# Patient Record
Sex: FEMALE | Race: WHITE | NOT HISPANIC OR LATINO | Employment: OTHER | URBAN - METROPOLITAN AREA
[De-identification: names, ages, dates, MRNs, and addresses within clinical notes are randomized per-mention and may not be internally consistent; named-entity substitution may affect disease eponyms.]

---

## 2017-01-15 ENCOUNTER — HOSPITAL ENCOUNTER (OUTPATIENT)
Dept: RADIOLOGY | Facility: MEDICAL CENTER | Age: 62
End: 2017-01-15
Attending: FAMILY MEDICINE
Payer: COMMERCIAL

## 2017-01-15 ENCOUNTER — OFFICE VISIT (OUTPATIENT)
Dept: URGENT CARE | Facility: PHYSICIAN GROUP | Age: 62
End: 2017-01-15
Payer: COMMERCIAL

## 2017-01-15 VITALS
WEIGHT: 135.8 LBS | HEART RATE: 65 BPM | OXYGEN SATURATION: 96 % | TEMPERATURE: 99.1 F | BODY MASS INDEX: 22.6 KG/M2 | DIASTOLIC BLOOD PRESSURE: 76 MMHG | SYSTOLIC BLOOD PRESSURE: 120 MMHG

## 2017-01-15 DIAGNOSIS — R30.0 DYSURIA: ICD-10-CM

## 2017-01-15 DIAGNOSIS — R10.32 LEFT LOWER QUADRANT PAIN: ICD-10-CM

## 2017-01-15 LAB
APPEARANCE UR: CLEAR
BILIRUB UR STRIP-MCNC: NORMAL MG/DL
COLOR UR AUTO: NORMAL
GLUCOSE UR STRIP.AUTO-MCNC: NORMAL MG/DL
KETONES UR STRIP.AUTO-MCNC: NORMAL MG/DL
LEUKOCYTE ESTERASE UR QL STRIP.AUTO: NORMAL
NITRITE UR QL STRIP.AUTO: NORMAL
PH UR STRIP.AUTO: 7 [PH] (ref 5–8)
PROT UR QL STRIP: NORMAL MG/DL
RBC UR QL AUTO: NORMAL
SP GR UR STRIP.AUTO: 1
UROBILINOGEN UR STRIP-MCNC: NORMAL MG/DL

## 2017-01-15 PROCEDURE — 99214 OFFICE O/P EST MOD 30 MIN: CPT | Performed by: FAMILY MEDICINE

## 2017-01-15 PROCEDURE — 81002 URINALYSIS NONAUTO W/O SCOPE: CPT | Performed by: FAMILY MEDICINE

## 2017-01-15 PROCEDURE — 74176 CT ABD & PELVIS W/O CONTRAST: CPT

## 2017-01-15 RX ORDER — METRONIDAZOLE 500 MG/1
500 TABLET ORAL EVERY 12 HOURS
Qty: 14 TAB | Refills: 0 | Status: SHIPPED | OUTPATIENT
Start: 2017-01-15 | End: 2017-01-22

## 2017-01-15 RX ORDER — SULFAMETHOXAZOLE AND TRIMETHOPRIM 800; 160 MG/1; MG/1
1 TABLET ORAL EVERY 12 HOURS
Qty: 14 TAB | Refills: 0 | Status: SHIPPED | OUTPATIENT
Start: 2017-01-15 | End: 2017-01-22

## 2017-01-15 ASSESSMENT — ENCOUNTER SYMPTOMS
FEVER: 0
CHILLS: 0
ABDOMINAL PAIN: 1
FOCAL WEAKNESS: 0
BACK PAIN: 1
EMPTYING BLADDER: 1
DIZZINESS: 0

## 2017-01-15 NOTE — MR AVS SNAPSHOT
Leidy Mahoney   1/15/2017 1:45 PM   Office Visit   MRN: [id number]    Department:  [facility]   Dept Phone:  [phone]    Description:  Female : 1955   Provider:  Bernard Mays M.D.           Reason for Visit     Cystitis pain when urinating, cramping, lower back pain, off and on x 4-5 days      Allergies as of 1/15/2017     Allergen Noted Reactions    Codeine 2009   Vomiting    Pcn [Penicillins] 2009   Vomiting    Levofloxacin 01/15/2017   Nausea      You were diagnosed with     Dysuria   [788.1.ICD-9-CM]       Left lower quadrant pain   [907210]         Vital Signs     Blood Pressure Pulse Temperature Weight Oxygen Saturation       120/76 mmHg 65 37.3 °C (99.1 °F) 61.598 kg (135 lb 12.8 oz) 96%       Basic Information     Date Of Birth Sex Race Ethnicity Preferred Language    1955 Female White Non- English      Health Maintenance        Date Due Completion Dates    IMM DTaP/Tdap/Td Vaccine (1 - Tdap) 1975 ---    PAP SMEAR 1977 ---    MAMMOGRAM 1996 ---    COLONOSCOPY 2006 ---    IMM INFLUENZA (1) 2016 ---    IMM ZOSTER VACCINE 2016 ---            Current Immunizations     No immunizations on file.      Below and/or attached are the medications your provider expects you to take. Review all of your home medications and newly ordered medications with your provider and/or pharmacist. Follow medication instructions as directed by your provider and/or pharmacist. Please keep your medication list with you and share with your provider. Update the information when medications are discontinued, doses are changed, or new medications (including over-the-counter products) are added; and carry medication information at all times in the event of emergency situations     Allergies:  CODEINE - Vomiting     PCN - Vomiting     LEVOFLOXACIN - Nausea               Medications  Valid as of: January 15, 2017 -  3:01 PM    Generic Name Brand Name Tablet  Size Instructions for use    Azithromycin (Tab) ZITHROMAX 250 MG uad        Meclizine HCl (Tab) ANTIVERT 25 MG Take 1 Tab by mouth 3 times a day as needed.        Meclizine HCl (Tab) ANTIVERT 25 MG Take 1 Tab by mouth 3 times a day as needed.        Mometasone Furoate (Suspension) NASONEX 50 MCG/ACT Spray 2 Act in nose every day. Each nostril        Triamcinolone Acetonide (Ointment) KENALOG 0.1 % Apply to affected area twice daily as needed.        .                 Medicines prescribed today were sent to:     [id number]  [place], [place] - [address] AT [facility]    [address] [place] [address]    Phone: [phone] Fax: [phone]    Open 24 Hours?: No      Medication refill instructions:       If your prescription bottle indicates you have medication refills left, it is not necessary to call your provider’s office. Please contact your pharmacy and they will refill your medication.    If your prescription bottle indicates you do not have any refills left, you may request refills at any time through one of the following ways: The online Medopad system (except Urgent Care), by calling your provider’s office, or by asking your pharmacy to contact your provider’s office with a refill request. Medication refills are processed only during regular business hours and may not be available until the next business day. Your provider may request additional information or to have a follow-up visit with you prior to refilling your medication.   *Please Note: Medication refills are assigned a new Rx number when refilled electronically. Your pharmacy may indicate that no refills were authorized even though a new prescription for the same medication is available at the pharmacy. Please request the medicine by name with the pharmacy before contacting your provider for a refill.        Your To Do List     Future Labs/Procedures Complete By Expires    CT-RENAL COLIC EVALUATION(A/P W/O)  As  directed 1/15/2018         GestureTek Access Code: [email or web address]  Expires: 2/14/2017  2:34 PM    GestureTek  A secure, online tool to manage your health information     [employer]’s GestureTek® is a secure, online tool that connects you to your personalized health information from the privacy of your home -- day or night - making it very easy for you to manage your healthcare. Once the activation process is completed, you can even access your medical information using the GestureTek joya, which is available for free in the Apple Joya store or Google Play store.     GestureTek provides the following levels of access (as shown below):   My Chart Features   [facility] Primary Care Doctor [facility]  Specialists [facility]  Urgent  Care Non-[facility]  Primary Care  Doctor   Email your healthcare team securely and privately 24/7 X X X    Manage appointments: schedule your next appointment; view details of past/upcoming appointments X      Request prescription refills. X      View recent personal medical records, including lab and immunizations X X X X   View health record, including health history, allergies, medications X X X X   Read reports about your outpatient visits, procedures, consult and ER notes X X X X   See your discharge summary, which is a recap of your hospital and/or ER visit that includes your diagnosis, lab results, and care plan. X X       How to register for GestureTek:  1. Go to  [email or web address].  2. Click on the Sign Up Now box, which takes you to the New Member Sign Up page. You will need to provide the following information:  a. Enter your GestureTek Access Code exactly as it appears at the top of this page. (You will not need to use this code after you’ve completed the sign-up process. If you do not sign up before the expiration date, you must request a new code.)   b. Enter your date of birth.   c. Enter your home email address.   d. Click Submit, and follow the next screen’s instructions.  3. Create a GestureTek ID.  This will be your [employer] login ID and cannot be changed, so think of one that is secure and easy to remember.  4. Create a [employer] password. You can change your password at any time.  5. Enter your Password Reset Question and Answer. This can be used at a later time if you forget your password.   6. Enter your e-mail address. This allows you to receive e-mail notifications when new information is available in [employer].  7. Click Sign Up. You can now view your health information.    For assistance activating your [employer] account, call [phone]

## 2017-01-15 NOTE — PROGRESS NOTES
Subjective:      Leidy Mahoney is a 61 y.o. female who presents with Cystitis    Chief Complaint   Patient presents with   • Cystitis     pain when urinating, cramping, lower back pain, off and on x 4-5 days        - 2-3 days intermittent lower back LLQ and Lt flank pain. No NVFC. Some dysuria at times.               Cystitis   Associated symptoms include frequency. Pertinent negatives include no chills.       Review of Systems   Constitutional: Negative for fever and chills.   Gastrointestinal: Positive for abdominal pain ( LLQ/flank ).   Genitourinary: Positive for dysuria and frequency.   Musculoskeletal: Positive for back pain.   Neurological: Negative for dizziness and focal weakness.          Objective:     /76 mmHg  Pulse 65  Temp(Src) 37.3 °C (99.1 °F)  Wt 61.598 kg (135 lb 12.8 oz)  SpO2 96%     Physical Exam   Constitutional: She appears well-developed. No distress.   HENT:   Head: Normocephalic and atraumatic.   Cardiovascular: Regular rhythm.    No murmur heard.  Pulmonary/Chest: Effort normal and breath sounds normal.   Abdominal: Soft. There is tenderness ( mild LLQ).   Neurological: She is alert.   Skin: Skin is warm and dry.   Psychiatric: She has a normal mood and affect. Judgment normal.   Nursing note and vitals reviewed.              Assessment/Plan:         1. Dysuria     2. Left lower quadrant pain  CT-RENAL COLIC EVALUATION(A/P W/O)    sulfamethoxazole-trimethoprim (BACTRIM DS) 800-160 MG tablet    metronidazole (FLAGYL) 500 MG Tab         Suspect mild case diverticulitis     Dx & d/c instructions discussed w/ patient and/or family members. Follow up w/ Prvt Dr or here in 3-4 days if not getting better, sooner if needed,  ER if worse and UC/PCP unavailable.        Possible side effects (i.e. Rash, GI upset/constipation, sedation, elevation of BP or sugars) of any medications given discussed.

## 2018-02-11 ENCOUNTER — OFFICE VISIT (OUTPATIENT)
Dept: URGENT CARE | Facility: PHYSICIAN GROUP | Age: 63
End: 2018-02-11
Payer: COMMERCIAL

## 2018-02-11 VITALS
WEIGHT: 135 LBS | TEMPERATURE: 97.8 F | OXYGEN SATURATION: 94 % | DIASTOLIC BLOOD PRESSURE: 86 MMHG | HEIGHT: 65 IN | RESPIRATION RATE: 14 BRPM | BODY MASS INDEX: 22.49 KG/M2 | SYSTOLIC BLOOD PRESSURE: 140 MMHG | HEART RATE: 75 BPM

## 2018-02-11 DIAGNOSIS — J01.01 ACUTE RECURRENT MAXILLARY SINUSITIS: ICD-10-CM

## 2018-02-11 PROCEDURE — 99203 OFFICE O/P NEW LOW 30 MIN: CPT | Performed by: FAMILY MEDICINE

## 2018-02-11 RX ORDER — DOXYCYCLINE HYCLATE 100 MG
100 TABLET ORAL 2 TIMES DAILY
Qty: 20 TAB | Refills: 0 | Status: SHIPPED | OUTPATIENT
Start: 2018-02-11 | End: 2018-02-21

## 2018-02-11 ASSESSMENT — ENCOUNTER SYMPTOMS
NAUSEA: 0
ABDOMINAL PAIN: 0
EYE DISCHARGE: 0
FEVER: 0
WHEEZING: 0
SINUS PAIN: 1
PALPITATIONS: 0
VOMITING: 0
SPUTUM PRODUCTION: 0
NECK PAIN: 0
DIARRHEA: 0
CHILLS: 0
EYE REDNESS: 0
SORE THROAT: 0
COUGH: 0

## 2018-02-11 ASSESSMENT — PAIN SCALES - GENERAL: PAINLEVEL: NO PAIN

## 2018-02-12 NOTE — PATIENT INSTRUCTIONS

## 2018-02-12 NOTE — PROGRESS NOTES
Subjective:      Leidy Mahoney is a 62 y.o. female who presents with No chief complaint on file.            No chief complaint on file.      HISTORY OF PRESENT ILLNESS: Patient is a 62 y.o. female who presents today due to 8 days of nasal congestion, headache, and  sinus pressure. She denies associated cough, difficulty breathing, confusion, nausea, vomiting or diarrhea. she has tried OTC cold/sinus medication at home without much improvement. She a history of seasonal allergies or sinus infections in the past. No known ill contacts at home. No recent antibiotic usage.     There are no active problems to display for this patient.      Allergies:Codeine; Pcn (penicillins); and Levofloxacin    Current Outpatient Prescriptions Ordered in Epic:  meclizine (ANTIVERT) 25 MG TABS, Take 1 Tab by mouth 3 times a day as needed., Disp: 30 Tab, Rfl: 0  azithromycin (ZITHROMAX) 250 MG TABS, uad, Disp: 6 Tab, Rfl: 0  triamcinolone acetonide (KENALOG) 0.1 % OINT, Apply to affected area twice daily as needed., Disp: 45 g, Rfl: 0  meclizine (ANTIVERT) 25 MG TABS, Take 1 Tab by mouth 3 times a day as needed., Disp: 30 Tab, Rfl: 0  mometasone (NASONEX) 50 MCG/ACT nasal spray, Spray 2 Act in nose every day. Each nostril, Disp: 1 Inhaler, Rfl: 0    No current Epic-ordered facility-administered medications on file.       Past Medical History:  No date: Hyperlipidemia    Smoking status: Never Smoker                                                              Alcohol use: No                Relation  Status                        Comments  Mo        Alive                           Fa                                Review of patient's family history indicates:    Heart Disease                  Father                                              Review of Systems   Constitutional: Negative for chills and fever.   HENT: Positive for congestion, ear pain and sinus pain. Negative for sore throat.    Eyes: Negative for discharge and  redness.   Respiratory: Negative for cough, sputum production and wheezing.    Cardiovascular: Negative for chest pain and palpitations.   Gastrointestinal: Negative for abdominal pain, diarrhea, nausea and vomiting.   Genitourinary: Negative for dysuria and urgency.   Musculoskeletal: Negative for neck pain.   Skin: Negative for rash.          Objective:     There were no vitals taken for this visit.     Physical Exam   Constitutional: She is oriented to person, place, and time. She appears well-developed and well-nourished.   HENT:   Head: Normocephalic and atraumatic.   Right Ear: External ear normal.   Left Ear: External ear normal.   Frontal sinus pressure    Eyes: EOM are normal. Pupils are equal, round, and reactive to light. Right eye exhibits no discharge. Left eye exhibits no discharge.   Neck: Normal range of motion. Neck supple.   Cardiovascular: Normal rate, regular rhythm and normal heart sounds.    Pulmonary/Chest: Effort normal and breath sounds normal. No respiratory distress. She has no wheezes.   Abdominal: Soft. Bowel sounds are normal.   Neurological: She is alert and oriented to person, place, and time.   Skin: Skin is warm.               Assessment/Plan:     Assessment/Plan:    1. Acute recurrent maxillary sinusitis  doxycycline (VIBRAMYCIN) 100 MG Tab     .      Antibiotic as directed, potential side effects of medication discussed. Flonase as directed.   Nasal washes with sterile saline solution daily. Sleep with HOB elevated, humidifier at night, rest, increase fluid intake.   Supportive care, differential diagnoses, and indications for immediate follow-up discussed with patient.   Pathogenesis of diagnosis discussed including typical length and natural progression.   Instructed to return to clinic or nearest emergency department for any change in condition, further concerns, or worsening of symptoms.  Patient states understanding of the plan of care and discharge instructions.  Instructed  to make an appointment, for follow up, with their primary care provider.

## 2019-02-28 ENCOUNTER — OFFICE VISIT (OUTPATIENT)
Dept: CARDIOLOGY | Facility: MEDICAL CENTER | Age: 64
End: 2019-02-28
Payer: COMMERCIAL

## 2019-02-28 VITALS
SYSTOLIC BLOOD PRESSURE: 110 MMHG | HEART RATE: 62 BPM | HEIGHT: 65 IN | OXYGEN SATURATION: 95 % | WEIGHT: 121 LBS | BODY MASS INDEX: 20.16 KG/M2 | DIASTOLIC BLOOD PRESSURE: 82 MMHG

## 2019-02-28 DIAGNOSIS — E78.2 MIXED HYPERLIPIDEMIA: ICD-10-CM

## 2019-02-28 PROCEDURE — 99202 OFFICE O/P NEW SF 15 MIN: CPT | Performed by: INTERNAL MEDICINE

## 2019-02-28 RX ORDER — LIFITEGRAST 50 MG/ML
SOLUTION/ DROPS OPHTHALMIC
Refills: 3 | COMMUNITY
Start: 2018-12-07 | End: 2019-03-12

## 2019-02-28 NOTE — PROGRESS NOTES
Chief Complaint   Patient presents with   • Hyperlipidemia     PP       Subjective:   Leidy Mahoney is a 63 y.o. female who presents today for follow-up of hyperlipidemia.  She is a former patient of Dr. Allen and this is her first meeting.  She has a history of hyperlipidemia undergoing dietary control and supplement therapy as well as a calcium score that was very mildly abnormal of 2.9 in 2016.  She has no symptoms is very active exercises regularly and has changed her diet markedly since she last had her cholesterol checked in 2016.  She has a strong family history of precocious coronary artery disease in her father and brother in their 30s and 50s.  She does not smoke drink excessively or do drugs.  She has 2 children and 8 grandchildren.    Past Medical History:   Diagnosis Date   • Hyperlipidemia      Past Surgical History:   Procedure Laterality Date   • LUMPECTOMY       Family History   Problem Relation Age of Onset   • Heart Disease Father      Social History     Social History   • Marital status:      Spouse name: N/A   • Number of children: N/A   • Years of education: N/A     Occupational History   • Not on file.     Social History Main Topics   • Smoking status: Never Smoker   • Smokeless tobacco: Never Used   • Alcohol use No   • Drug use: No   • Sexual activity: Not on file     Other Topics Concern   • Not on file     Social History Narrative   • No narrative on file     Allergies   Allergen Reactions   • Codeine Vomiting   • Pcn [Penicillins] Vomiting   • Levofloxacin Nausea     Outpatient Encounter Prescriptions as of 2/28/2019   Medication Sig Dispense Refill   • Multiple Vitamins-Minerals (MULTIVITAMIN ADULT PO) Take  by mouth.     • XIIDRA 5 % Solution INT 1 DROP IN OU BID  3   • meclizine (ANTIVERT) 25 MG TABS Take 1 Tab by mouth 3 times a day as needed. (Patient not taking: Reported on 2/28/2019) 30 Tab 0   • azithromycin (ZITHROMAX) 250 MG TABS uad (Patient not taking: Reported on  "2/28/2019) 6 Tab 0   • triamcinolone acetonide (KENALOG) 0.1 % OINT Apply to affected area twice daily as needed. (Patient not taking: Reported on 2/28/2019) 45 g 0   • meclizine (ANTIVERT) 25 MG TABS Take 1 Tab by mouth 3 times a day as needed. (Patient not taking: Reported on 2/28/2019) 30 Tab 0   • mometasone (NASONEX) 50 MCG/ACT nasal spray Spray 2 Act in nose every day. Each nostril (Patient not taking: Reported on 2/28/2019) 1 Inhaler 0     No facility-administered encounter medications on file as of 2/28/2019.      Review of Systems   All other systems reviewed and are negative.       Objective:   /82 (BP Location: Left arm, Patient Position: Sitting, BP Cuff Size: Adult)   Pulse 62   Ht 1.651 m (5' 5\")   Wt 54.9 kg (121 lb)   SpO2 95%   BMI 20.14 kg/m²     Physical Exam   Constitutional: She is oriented to person, place, and time. She appears well-developed and well-nourished. No distress.   Thin  Appears younger than stated age.   HENT:   Head: Normocephalic and atraumatic.   Right Ear: External ear normal.   Left Ear: External ear normal.   Mouth/Throat: No oropharyngeal exudate.   Eyes: Pupils are equal, round, and reactive to light. Conjunctivae and EOM are normal. Right eye exhibits no discharge. Left eye exhibits no discharge. No scleral icterus.   Neck: Normal range of motion. Neck supple. No JVD present. No tracheal deviation present. No thyromegaly present.   Cardiovascular: Normal rate, regular rhythm, S1 normal, S2 normal, normal heart sounds and intact distal pulses.  PMI is not displaced.  Exam reveals no gallop, no S3, no S4 and no friction rub.    No murmur heard.  Pulses:       Carotid pulses are 2+ on the right side, and 2+ on the left side.       Radial pulses are 2+ on the right side, and 2+ on the left side.        Popliteal pulses are 2+ on the right side, and 2+ on the left side.        Dorsalis pedis pulses are 2+ on the right side, and 2+ on the left side.        Posterior " tibial pulses are 2+ on the right side, and 2+ on the left side.   Pulmonary/Chest: Effort normal and breath sounds normal. No respiratory distress. She has no wheezes. She has no rales. She exhibits no tenderness.   Abdominal: Soft. Bowel sounds are normal. She exhibits no distension. There is no tenderness.   Musculoskeletal: Normal range of motion. She exhibits no edema or tenderness.   Neurological: She is alert and oriented to person, place, and time. No cranial nerve deficit (Cranial nerves II through XII grossly intact).   Skin: Skin is warm and dry. No rash noted. She is not diaphoretic. No erythema.   Psychiatric: She has a normal mood and affect. Her behavior is normal. Judgment and thought content normal.   Vitals reviewed.    LABS:  Lab Results   Component Value Date/Time    CHOLSTRLTOT 298 (H) 11/16/2016 08:34 AM     (H) 11/16/2016 08:34 AM    HDL 72 11/16/2016 08:34 AM    TRIGLYCERIDE 165 (H) 11/16/2016 08:34 AM       Lab Results   Component Value Date/Time    WBC 6.0 06/18/2007 02:49 PM    RBC 4.28 06/18/2007 02:49 PM    HEMOGLOBIN 14.4 06/18/2007 02:49 PM    HEMATOCRIT 42.0 06/18/2007 02:49 PM    MCV 98.1 (H) 06/18/2007 02:49 PM    NEUTSPOLYS 60.6 06/18/2007 02:49 PM    LYMPHOCYTES 32.7 06/18/2007 02:49 PM    MONOCYTES 4.4 06/18/2007 02:49 PM    EOSINOPHILS 1.3 06/18/2007 02:49 PM    BASOPHILS 1.1 06/18/2007 02:49 PM     Lab Results   Component Value Date/Time    SODIUM 138 06/18/2007 02:49 PM    POTASSIUM 4.5 06/18/2007 02:49 PM    CHLORIDE 106 06/18/2007 02:49 PM    CO2 27 06/18/2007 02:49 PM         No results found for: ALKPHOSPHAT, ASTSGOT, ALTSGPT, TBILIRUBIN   No results found for: BNPBTYPENAT   No results found for: TSH  No results found for: PROTHROMBTM, INR       Assessment:     1. Mixed hyperlipidemia  Comp Metabolic Panel    Lipid Profile       Medical Decision Making:  Today's Assessment / Status / Plan:     We discussed that previously her lipid profile did place her at elevated  risk and the dietary measures were very appropriate.  Time got away from her and she has yet to follow-up with a repeat lipid profile.  She takes a supplement and I am unsure if it will help but her dietary and exercise habits should be helpful.  She has a very strong family history and very mildly abnormal calcium score, and in context of these it would suggest that if her cholesterol is not markedly improved that we initiate statin therapy.  She is agreeable for a recheck and a follow-up visit after.

## 2019-03-07 DIAGNOSIS — E78.2 MIXED HYPERLIPIDEMIA: ICD-10-CM

## 2019-03-12 ENCOUNTER — OFFICE VISIT (OUTPATIENT)
Dept: CARDIOLOGY | Facility: MEDICAL CENTER | Age: 64
End: 2019-03-12
Payer: COMMERCIAL

## 2019-03-12 VITALS
HEIGHT: 65 IN | SYSTOLIC BLOOD PRESSURE: 130 MMHG | WEIGHT: 122 LBS | DIASTOLIC BLOOD PRESSURE: 80 MMHG | BODY MASS INDEX: 20.33 KG/M2 | OXYGEN SATURATION: 98 % | HEART RATE: 66 BPM

## 2019-03-12 DIAGNOSIS — E78.01 FAMILIAL HYPERCHOLESTEREMIA: ICD-10-CM

## 2019-03-12 PROCEDURE — 99213 OFFICE O/P EST LOW 20 MIN: CPT | Performed by: INTERNAL MEDICINE

## 2019-03-12 RX ORDER — ROSUVASTATIN CALCIUM 10 MG/1
10 TABLET, COATED ORAL EVERY EVENING
Qty: 90 TAB | Refills: 3 | Status: SHIPPED | OUTPATIENT
Start: 2019-03-12 | End: 2023-03-15

## 2019-03-12 NOTE — PROGRESS NOTES
Chief Complaint   Patient presents with   • Hyperlipidemia     follow up lab results       Subjective:   Leidy Mahoney is a 63 y.o. female who presents today for follow-up of hyperlipidemia.  She is a former patient of Dr. Allen and this is her first meeting.  She has a history of hyperlipidemia undergoing dietary control and supplement therapy as well as a calcium score that was very mildly abnormal of 2.9 in 2016.  She has no symptoms is very active exercises regularly and has changed her diet markedly since she last had her cholesterol checked in 2016.  She has a strong family history of precocious coronary artery disease in her father and brother in their 30s and 50s.  She does not smoke drink excessively or do drugs.  She has 2 children and 8 grandchildren.    In the interim, her cholesterol has returned still markedly abnormal despite her lifestyle interventions and supplementation.    Past Medical History:   Diagnosis Date   • Hyperlipidemia      Past Surgical History:   Procedure Laterality Date   • LUMPECTOMY       Family History   Problem Relation Age of Onset   • Heart Disease Father      Social History     Social History   • Marital status:      Spouse name: N/A   • Number of children: N/A   • Years of education: N/A     Occupational History   • Not on file.     Social History Main Topics   • Smoking status: Never Smoker   • Smokeless tobacco: Never Used   • Alcohol use No   • Drug use: No   • Sexual activity: Not on file     Other Topics Concern   • Not on file     Social History Narrative   • No narrative on file     Allergies   Allergen Reactions   • Codeine Vomiting   • Pcn [Penicillins] Vomiting   • Levofloxacin Nausea     Outpatient Encounter Prescriptions as of 3/12/2019   Medication Sig Dispense Refill   • rosuvastatin (CRESTOR) 10 MG Tab Take 1 Tab by mouth every evening. 90 Tab 3   • Multiple Vitamins-Minerals (MULTIVITAMIN ADULT PO) Take  by mouth.     • [DISCONTINUED] XIIDRA 5 %  "Solution INT 1 DROP IN OU BID  3   • [DISCONTINUED] meclizine (ANTIVERT) 25 MG TABS Take 1 Tab by mouth 3 times a day as needed. (Patient not taking: Reported on 2/28/2019) 30 Tab 0   • [DISCONTINUED] azithromycin (ZITHROMAX) 250 MG TABS uad (Patient not taking: Reported on 2/28/2019) 6 Tab 0   • [DISCONTINUED] triamcinolone acetonide (KENALOG) 0.1 % OINT Apply to affected area twice daily as needed. (Patient not taking: Reported on 2/28/2019) 45 g 0   • [DISCONTINUED] meclizine (ANTIVERT) 25 MG TABS Take 1 Tab by mouth 3 times a day as needed. (Patient not taking: Reported on 2/28/2019) 30 Tab 0   • [DISCONTINUED] mometasone (NASONEX) 50 MCG/ACT nasal spray Spray 2 Act in nose every day. Each nostril (Patient not taking: Reported on 2/28/2019) 1 Inhaler 0     No facility-administered encounter medications on file as of 3/12/2019.      Review of Systems   All other systems reviewed and are negative.       Objective:   /80 (BP Location: Left arm, Patient Position: Sitting)   Pulse 66   Ht 1.651 m (5' 5\")   Wt 55.3 kg (122 lb)   SpO2 98%   BMI 20.30 kg/m²     Physical Exam   Constitutional: She is oriented to person, place, and time. She appears well-developed and well-nourished. No distress.   Thin  Appears younger than stated age.   HENT:   Head: Normocephalic and atraumatic.   Right Ear: External ear normal.   Left Ear: External ear normal.   Mouth/Throat: No oropharyngeal exudate.   Eyes: Pupils are equal, round, and reactive to light. Conjunctivae and EOM are normal. Right eye exhibits no discharge. Left eye exhibits no discharge. No scleral icterus.   Neck: Normal range of motion. Neck supple. No JVD present. No tracheal deviation present. No thyromegaly present.   Cardiovascular: Normal rate, regular rhythm, S1 normal, S2 normal, normal heart sounds and intact distal pulses.  PMI is not displaced.  Exam reveals no gallop, no S3, no S4 and no friction rub.    No murmur heard.  Pulses:       Carotid " pulses are 2+ on the right side, and 2+ on the left side.       Radial pulses are 2+ on the right side, and 2+ on the left side.        Popliteal pulses are 2+ on the right side, and 2+ on the left side.        Dorsalis pedis pulses are 2+ on the right side, and 2+ on the left side.        Posterior tibial pulses are 2+ on the right side, and 2+ on the left side.   Pulmonary/Chest: Effort normal and breath sounds normal. No respiratory distress. She has no wheezes. She has no rales. She exhibits no tenderness.   Abdominal: Soft. Bowel sounds are normal. She exhibits no distension. There is no tenderness.   Musculoskeletal: Normal range of motion. She exhibits no edema or tenderness.   Neurological: She is alert and oriented to person, place, and time. No cranial nerve deficit (Cranial nerves II through XII grossly intact).   Skin: Skin is warm and dry. No rash noted. She is not diaphoretic. No erythema.   Psychiatric: She has a normal mood and affect. Her behavior is normal. Judgment and thought content normal.   Vitals reviewed.    LABS:  Lab Results   Component Value Date/Time    CHOLSTRLTOT 298 (H) 11/16/2016 08:34 AM     (H) 11/16/2016 08:34 AM    HDL 72 11/16/2016 08:34 AM    TRIGLYCERIDE 165 (H) 11/16/2016 08:34 AM       Lab Results   Component Value Date/Time    WBC 6.0 06/18/2007 02:49 PM    RBC 4.28 06/18/2007 02:49 PM    HEMOGLOBIN 14.4 06/18/2007 02:49 PM    HEMATOCRIT 42.0 06/18/2007 02:49 PM    MCV 98.1 (H) 06/18/2007 02:49 PM    NEUTSPOLYS 60.6 06/18/2007 02:49 PM    LYMPHOCYTES 32.7 06/18/2007 02:49 PM    MONOCYTES 4.4 06/18/2007 02:49 PM    EOSINOPHILS 1.3 06/18/2007 02:49 PM    BASOPHILS 1.1 06/18/2007 02:49 PM     Lab Results   Component Value Date/Time    SODIUM 138 06/18/2007 02:49 PM    POTASSIUM 4.5 06/18/2007 02:49 PM    CHLORIDE 106 06/18/2007 02:49 PM    CO2 27 06/18/2007 02:49 PM         No results found for: ALKPHOSPHAT, ASTSGOT, ALTSGPT, TBILIRUBIN   No results found for:  BNPBTYPENAT   No results found for: TSH  No results found for: PROTHROMBTM, INR     LABS (3/6/19): Reviewed in media.    Assessment:     1. Familial hypercholesteremia  rosuvastatin (CRESTOR) 10 MG Tab    Comp Metabolic Panel    Lipid Profile       Medical Decision Making:  Today's Assessment / Status / Plan:     We discussed that previously her lipid profile did place her at elevated risk and the dietary measures were very appropriate.  She has a very strong family history and very mildly abnormal calcium score, and in context of these it would suggest that with her cholesterol remaining very abnormal we initiate pharmacotherapy. Discussed options. Decided on Crestor and if not affordable then Lipitor. GOal LDL <.  She is agreeable for a recheck and a follow-up visit after 3 M.

## 2019-04-04 ENCOUNTER — OFFICE VISIT (OUTPATIENT)
Dept: URGENT CARE | Facility: PHYSICIAN GROUP | Age: 64
End: 2019-04-04
Payer: COMMERCIAL

## 2019-04-04 VITALS
HEART RATE: 72 BPM | SYSTOLIC BLOOD PRESSURE: 102 MMHG | DIASTOLIC BLOOD PRESSURE: 70 MMHG | TEMPERATURE: 98.1 F | RESPIRATION RATE: 16 BRPM | HEIGHT: 65 IN | BODY MASS INDEX: 20.39 KG/M2 | OXYGEN SATURATION: 93 % | WEIGHT: 122.4 LBS

## 2019-04-04 DIAGNOSIS — J01.90 ACUTE RHINOSINUSITIS: ICD-10-CM

## 2019-04-04 PROCEDURE — 99203 OFFICE O/P NEW LOW 30 MIN: CPT | Performed by: EMERGENCY MEDICINE

## 2019-04-04 RX ORDER — DOXYCYCLINE HYCLATE 100 MG
100 TABLET ORAL 2 TIMES DAILY
Qty: 14 TAB | Refills: 0 | Status: SHIPPED | OUTPATIENT
Start: 2019-04-04 | End: 2023-03-15

## 2019-04-04 ASSESSMENT — ENCOUNTER SYMPTOMS
FOCAL WEAKNESS: 0
SPUTUM PRODUCTION: 1
SINUS PAIN: 1
COUGH: 1
VOMITING: 0
WHEEZING: 0
DIARRHEA: 0
HEADACHES: 1
SHORTNESS OF BREATH: 0
SINUS PRESSURE: 1
SWOLLEN GLANDS: 0
SENSORY CHANGE: 0
NAUSEA: 0
SORE THROAT: 0

## 2019-04-05 NOTE — PROGRESS NOTES
Subjective:      Leidy Mahoney is a 63 y.o. female who presents with Sinus Pain (x 4 days// headache// sinus pressure )            Sinusitis   This is a recurrent problem. Episode onset: 5 days. The problem has been gradually worsening since onset. There has been no fever. The pain is moderate. Associated symptoms include congestion, coughing, headaches and sinus pressure. Pertinent negatives include no ear pain, shortness of breath, sneezing, sore throat or swollen glands. Past treatments include nothing.       Review of Systems   Constitutional: Positive for malaise/fatigue.   HENT: Positive for congestion, nosebleeds, sinus pain and sinus pressure. Negative for ear pain, hearing loss, sneezing and sore throat.    Respiratory: Positive for cough and sputum production. Negative for shortness of breath and wheezing.    Gastrointestinal: Negative for diarrhea, nausea and vomiting.   Skin: Negative for rash.   Neurological: Positive for headaches. Negative for sensory change and focal weakness.   Endo/Heme/Allergies: Negative for environmental allergies.     PMH:  has a past medical history of Hyperlipidemia.  MEDS:   Current Outpatient Prescriptions:   •  doxycycline (VIBRAMYCIN) 100 MG Tab, Take 1 Tab by mouth 2 times a day., Disp: 14 Tab, Rfl: 0  •  rosuvastatin (CRESTOR) 10 MG Tab, Take 1 Tab by mouth every evening. (Patient not taking: Reported on 4/4/2019), Disp: 90 Tab, Rfl: 3  •  Multiple Vitamins-Minerals (MULTIVITAMIN ADULT PO), Take  by mouth., Disp: , Rfl:   ALLERGIES:   Allergies   Allergen Reactions   • Codeine Vomiting   • Pcn [Penicillins] Vomiting   • Levofloxacin Nausea     SURGHX:   Past Surgical History:   Procedure Laterality Date   • LUMPECTOMY       SOCHX:  reports that she has never smoked. She has never used smokeless tobacco. She reports that she does not drink alcohol or use drugs.  FH: family history includes Heart Disease in her father.       Objective:     /70   Pulse 72    "Temp 36.7 °C (98.1 °F) (Temporal)   Resp 16   Ht 1.651 m (5' 5\")   Wt 55.5 kg (122 lb 6.4 oz)   SpO2 93%   BMI 20.37 kg/m²      Physical Exam   Constitutional: She is oriented to person, place, and time. She appears well-developed and well-nourished. She is cooperative. She does not appear ill. No distress.   HENT:   Right Ear: Tympanic membrane and ear canal normal.   Left Ear: Tympanic membrane and ear canal normal.   Nose: Mucosal edema and septal deviation present. No rhinorrhea. No epistaxis. Right sinus exhibits no maxillary sinus tenderness and no frontal sinus tenderness. Left sinus exhibits no maxillary sinus tenderness and no frontal sinus tenderness.   Mouth/Throat: Uvula is midline and oropharynx is clear and moist.   Eyes: Conjunctivae are normal.   Neck: Trachea normal. Neck supple.   Cardiovascular: Normal rate, regular rhythm and normal heart sounds.    Pulmonary/Chest: Effort normal. She has no decreased breath sounds. She has no wheezes. She has no rhonchi. She has no rales.   Lymphadenopathy:     She has no cervical adenopathy.   Neurological: She is alert and oriented to person, place, and time.   Skin: Skin is warm and dry.   Psychiatric: She has a normal mood and affect.               Assessment/Plan:     1. Acute rhinosinusitis  Recommended supportive care measures, including rest, increasing oral fluid intake and use of over-the-counter medications for relief of symptoms.  If not resolving or worsening:  - doxycycline (VIBRAMYCIN) 100 MG Tab; Take 1 Tab by mouth 2 times a day.  Dispense: 14 Tab; Refill: 0      "

## 2019-04-05 NOTE — PATIENT INSTRUCTIONS
Use saline nasal irrigation, such as with a Neti Pot, as needed daily for relief of nasal or sinus congestion relief.  Use behind-the-counter pseudoephedrine (Sudafed) oral decongestant as needed; avoid bedtime use.  Use over-the-counter oxymetazoline (Afrin) nasal spray as needed for up to 3-5 days only; follow package instructions for dosing.  Use over-the-counter pain reliever, such as acetaminophen (Tylenol), ibuprofen (Advil, Motrin) or naproxen (Aleve) as needed; follow package directions for dosing.  Use the prescribed antibiotic only if symptoms persist beyond 7-10 days, or re-worsen. Use an oral probiotic daily, such as Culturelle, Align, or yogurt to reduce gastrointestinal symptoms from antibiotic use.  Sinusitis, Adult  Sinusitis is soreness and inflammation of your sinuses. Sinuses are hollow spaces in the bones around your face. They are located:  · Around your eyes.  · In the middle of your forehead.  · Behind your nose.  · In your cheekbones.  Your sinuses and nasal passages are lined with a stringy fluid (mucus). Mucus normally drains out of your sinuses. When your nasal tissues get inflamed or swollen, the mucus can get trapped or blocked so air cannot flow through your sinuses. This lets bacteria, viruses, and funguses grow, and that leads to infection.  Follow these instructions at home:  Medicines  · Take, use, or apply over-the-counter and prescription medicines only as told by your doctor. These may include nasal sprays.  · If you were prescribed an antibiotic medicine, take it as told by your doctor. Do not stop taking the antibiotic even if you start to feel better.  Hydrate and Humidify  · Drink enough water to keep your pee (urine) clear or pale yellow.  · Use a cool mist humidifier to keep the humidity level in your home above 50%.  · Breathe in steam for 10-15 minutes, 3-4 times a day or as told by your doctor. You can do this in the bathroom while a hot shower is running.  · Try not to  spend time in cool or dry air.  Rest  · Rest as much as possible.  · Sleep with your head raised (elevated).  · Make sure to get enough sleep each night.  General instructions  · Put a warm, moist washcloth on your face 3-4 times a day or as told by your doctor. This will help with discomfort.  · Wash your hands often with soap and water. If there is no soap and water, use hand .  · Do not smoke. Avoid being around people who are smoking (secondhand smoke).  · Keep all follow-up visits as told by your doctor. This is important.  Contact a doctor if:  · You have a fever.  · Your symptoms get worse.  · Your symptoms do not get better within 10 days.  Get help right away if:  · You have a very bad headache.  · You cannot stop throwing up (vomiting).  · You have pain or swelling around your face or eyes.  · You have trouble seeing.  · You feel confused.  · Your neck is stiff.  · You have trouble breathing.  This information is not intended to replace advice given to you by your health care provider. Make sure you discuss any questions you have with your health care provider.  Document Released: 06/05/2009 Document Revised: 08/13/2017 Document Reviewed: 10/12/2016  [employer] Interactive Patient Education © 2017 [employer]

## 2019-06-13 DIAGNOSIS — E78.01 FAMILIAL HYPERCHOLESTEREMIA: ICD-10-CM

## 2023-03-15 ENCOUNTER — APPOINTMENT (OUTPATIENT)
Dept: LAB | Facility: MEDICAL CENTER | Age: 68
End: 2023-03-15
Payer: MEDICARE

## 2023-03-15 ENCOUNTER — OFFICE VISIT (OUTPATIENT)
Dept: URGENT CARE | Facility: PHYSICIAN GROUP | Age: 68
End: 2023-03-15
Payer: MEDICARE

## 2023-03-15 VITALS
TEMPERATURE: 97 F | SYSTOLIC BLOOD PRESSURE: 120 MMHG | OXYGEN SATURATION: 100 % | RESPIRATION RATE: 18 BRPM | BODY MASS INDEX: 21.99 KG/M2 | HEIGHT: 65 IN | HEART RATE: 84 BPM | DIASTOLIC BLOOD PRESSURE: 76 MMHG | WEIGHT: 132 LBS

## 2023-03-15 DIAGNOSIS — J32.9 RHINOSINUSITIS: ICD-10-CM

## 2023-03-15 PROCEDURE — 99213 OFFICE O/P EST LOW 20 MIN: CPT | Performed by: PHYSICIAN ASSISTANT

## 2023-03-15 ASSESSMENT — ENCOUNTER SYMPTOMS
HEMOPTYSIS: 0
SHORTNESS OF BREATH: 0
CHILLS: 0
EYES NEGATIVE: 1
SPUTUM PRODUCTION: 1
COUGH: 1
GASTROINTESTINAL NEGATIVE: 1
FEVER: 0
WHEEZING: 0
NEUROLOGICAL NEGATIVE: 1
CARDIOVASCULAR NEGATIVE: 1
MUSCULOSKELETAL NEGATIVE: 1
WEIGHT LOSS: 0

## 2023-03-15 NOTE — PROGRESS NOTES
Subjective:     CHIEF COMPLAINT  Chief Complaint   Patient presents with    Sinusitis     X 3 days       HPI  Leidy Mahoney is a very pleasant 67 y.o. female who presents with 3 days of runny nose, cough, and sinus congestion.  Patient states that this started on Sunday and has gotten progressively worse.  She has not taken anything for her symptoms.  Patient denies fever, shortness of breath, chest pain, nausea, vomiting, diarrhea.    REVIEW OF SYSTEMS  Review of Systems   Constitutional:  Negative for chills, fever, malaise/fatigue and weight loss.   HENT: Negative.     Eyes: Negative.    Respiratory:  Positive for cough and sputum production. Negative for hemoptysis, shortness of breath and wheezing.    Cardiovascular: Negative.    Gastrointestinal: Negative.    Musculoskeletal: Negative.    Neurological: Negative.      PAST MEDICAL HISTORY  Patient Active Problem List    Diagnosis Date Noted    Mixed hyperlipidemia 02/28/2019       SURGICAL HISTORY   has a past surgical history that includes lumpectomy.    ALLERGIES  Allergies   Allergen Reactions    Codeine Vomiting    Pcn [Penicillins] Vomiting    Levofloxacin Nausea       CURRENT MEDICATIONS  Home Medications       Reviewed by Devon Aranda P.A.-C. (Physician Assistant) on 03/15/23 at 0821  Med List Status: <None>     Medication Last Dose Status   doxycycline (VIBRAMYCIN) 100 MG Tab Not Taking Active   Multiple Vitamins-Minerals (MULTIVITAMIN ADULT PO) Taking Active   rosuvastatin (CRESTOR) 10 MG Tab  Active                    SOCIAL HISTORY  Social History     Tobacco Use    Smoking status: Never    Smokeless tobacco: Never   Vaping Use    Vaping Use: Never used   Substance and Sexual Activity    Alcohol use: No    Drug use: No    Sexual activity: Not on file       FAMILY HISTORY  Family History   Problem Relation Age of Onset    Heart Disease Father           Objective:     VITAL SIGNS: /76   Pulse 84   Temp 36.1 °C (97 °F)   Resp 18   Ht  "1.638 m (5' 4.5\")   Wt 59.9 kg (132 lb)   SpO2 100%   BMI 22.31 kg/m²     PHYSICAL EXAM  Physical Exam  Constitutional:       General: She is not in acute distress.     Appearance: Normal appearance. She is not ill-appearing, toxic-appearing or diaphoretic.   HENT:      Head: Normocephalic and atraumatic.      Right Ear: Ear canal and external ear normal.      Left Ear: Ear canal and external ear normal.      Ears:      Comments: Bilateral serous middle ear effusion     Nose: Rhinorrhea (clear) present. No congestion.      Mouth/Throat:      Mouth: Mucous membranes are moist.      Pharynx: No oropharyngeal exudate or posterior oropharyngeal erythema.   Eyes:      Conjunctiva/sclera: Conjunctivae normal.   Cardiovascular:      Rate and Rhythm: Normal rate and regular rhythm.      Pulses: Normal pulses.      Heart sounds: Normal heart sounds.   Pulmonary:      Effort: Pulmonary effort is normal.      Breath sounds: Normal breath sounds. No wheezing.   Musculoskeletal:      Cervical back: Normal range of motion. No muscular tenderness.   Lymphadenopathy:      Cervical: No cervical adenopathy.   Skin:     General: Skin is warm and dry.      Capillary Refill: Capillary refill takes less than 2 seconds.   Neurological:      Mental Status: She is alert.   Psychiatric:         Mood and Affect: Mood normal.         Thought Content: Thought content normal.       Assessment/Plan:     1. Rhinosinusitis      MDM/Comments:    Leidy is a 67-year-old female presenting with 3 days of runny nose, congestion, and cough.  Patient's history and physical is consistent with likely viral upper respiratory infection.  Recommend patient use over-the-counter Flonase, Sudafed, and Mucinex for supportive care.  Patient declined Tessalon pearls for cough.  Patient is to return to clinic if symptoms worsen, she becomes short of breath, chest pain increased sputum production and sinus pressure past 7 to 10 days.    Differential diagnosis, " natural history, supportive care, and indications for immediate follow-up discussed. All questions answered. Patient agrees with the plan of care.    Follow-up as needed if symptoms worsen or fail to improve to PCP, Urgent care or Emergency Room.    I have personally reviewed prior external notes and test results pertinent to today's visit.  I have independently reviewed and interpreted all diagnostics ordered during this urgent care acute visit.   Discussed management options (risks,benefits, and alternatives to treatment). Pt expresses understanding and the treatment plan was agreed upon. Questions were encouraged and answered to pt's satisfaction.    Please note that this dictation was created using voice recognition software. I have made a reasonable attempt to correct obvious errors, but I expect that there are errors of grammar and possibly content that I did not discover before finalizing the note.